# Patient Record
Sex: MALE | Race: WHITE
[De-identification: names, ages, dates, MRNs, and addresses within clinical notes are randomized per-mention and may not be internally consistent; named-entity substitution may affect disease eponyms.]

---

## 2019-08-29 ENCOUNTER — HOSPITAL ENCOUNTER (EMERGENCY)
Dept: HOSPITAL 65 - ER | Age: 49
Discharge: HOME | End: 2019-08-29
Payer: OTHER GOVERNMENT

## 2019-08-29 VITALS — BODY MASS INDEX: 26.51 KG/M2 | WEIGHT: 200 LBS | HEIGHT: 73 IN

## 2019-08-29 VITALS — SYSTOLIC BLOOD PRESSURE: 122 MMHG | DIASTOLIC BLOOD PRESSURE: 88 MMHG

## 2019-08-29 VITALS — SYSTOLIC BLOOD PRESSURE: 122 MMHG | DIASTOLIC BLOOD PRESSURE: 79 MMHG
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
## 2019-08-29 DIAGNOSIS — F17.210: ICD-10-CM

## 2019-08-29 DIAGNOSIS — S33.5XXA: Primary | ICD-10-CM
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
## 2019-08-29 PROCEDURE — 96372 THER/PROPH/DIAG INJ SC/IM: CPT

## 2019-08-29 PROCEDURE — 99284 EMERGENCY DEPT VISIT MOD MDM: CPT

## 2019-08-29 RX ADMIN — DIAZEPAM STA MG: 5 TABLET ORAL at 15:42

## 2019-08-29 RX ADMIN — DIAZEPAM STA MG: 5 INJECTION, SOLUTION INTRAMUSCULAR; INTRAVENOUS at 15:42

## 2019-08-29 RX ADMIN — KETOROLAC TROMETHAMINE ONE MG: 60 INJECTION, SOLUTION INTRAMUSCULAR at 15:39

## 2019-08-29 RX ADMIN — MEPERIDINE HYDROCHLORIDE STA MG: 50 INJECTION INTRAMUSCULAR; INTRAVENOUS; SUBCUTANEOUS at 15:38

## 2019-08-29 NOTE — NUR
ARRIVAL

PATIENT ARRIVED TO [facility] AMBULATORY, C/O OF BACK PAIN SINCE MONDAY, PATIENT 
STATES HE WAS PULLING HIS SEMI TRUCK INTO AN AREA WHEN HE HIT THE PIER CAUSES 
HIM TO JAR HIS BACK, ATTEMPTED TO TREAT HIMSELF AT HOME, CAME TO THE ED FOR 
EVAL.

## 2019-08-29 NOTE — ER.PDOC
General


Chief Complaint:  Lower Back Pain or Injury


Stated Complaint:  BACK PAIN


Time seen by MD:  16:00


Source:  patient


Exam Limitations:  no limitations





History of Present Illness


Timing/Duration:  week


Severity/Quality:  moderate


Radiation:  upper legs


Method of Injury:  twisted


Modifying Factors:  improves with immobilization, improves with pain medication,

improves with rest


Associated Symptoms:  muscle spasms


Allergies:  


Coded Allergies:  


     No Known Allergies (Unverified , 8/29/19)


Home Meds


No Active Prescriptions or Reported Meds





Past Medical History


Medical History:  no pertinent history


Surgical History:  no surgical history





Social History


Smoking:  cigarettes


Alcohol Use:  heavy


Drug Use:  none





Reviewed


Nursing Reviewed:  Vital Signs, Abn. Noted





Review of Systems


All Other Systems:  Reviewed and Negative





Physical Exam


General Appearance:  No Apparent Distress, WD/WN


HEENT:  PERRL/EOMI, Normal ENT Inspection, TMs Normal, Pharynx Normal


Neck:  Non-Tender, Normal Alignment


Cardiovascular/Respiratory:  Regular Rate, Rhythm, No M/R/G, Normal Peripheral 

Pulses, No JVD, Normal Breath Sounds, No Respiratory Distress


Gastrointestinal:  Normal Bowel Sounds, No Organomegaly, No Pulsatile Mass, Non 

Tender, Soft


Back:  Other (r si joint tenderness)


Extremities:  No Evidence of Injury, Normal Range of Motion, Non-Tender, No 

Pedal Edema, Pelvis Stable, Other (SLR 60 DEG R  SIDE)


Neuro/Psych:  Alert,  nml/symmetrical, mood/effect nml, No Motor/Sensory 

Deficits, Relexes nml


Skin:  Normal Color, Warm/Dry





Results/Orders


Results/Orders





Vital Signs








  Date Time  Temp Pulse Resp B/P (MAP) Pulse Ox O2 Delivery O2 Flow Rate FiO2


 


8/29/19 15:22 98.1 102 20 122/79 (93) 95 Room Air  


 


8/29/19 15:19 98.1 102 20  95 Room Air  


 


8/29/19 15:17 98.1 102 20     











Departure


Time of Disposition:  16:00


Disposition:  01 HOME, SELF-CARE


Impression:  


   Primary Impression:  


   Lumbar sprain


Condition:  Improved


Referrals:  


DANY FERGUSON DO, MD (PCP)


PRIMARY CARE PROVIDER


Scripts


No Active Prescriptions or Reported Meds


Duration or Time Spent with Pa:  ESTEFANI FUENTES MD              Aug 29, 2019 15:28

## 2019-12-04 ENCOUNTER — HOSPITAL ENCOUNTER (EMERGENCY)
Dept: HOSPITAL 65 - ER | Age: 49
Discharge: LEFT BEFORE DISCHARGE | End: 2019-12-04
Payer: OTHER GOVERNMENT

## 2019-12-04 VITALS — HEIGHT: 73 IN | WEIGHT: 200 LBS | BODY MASS INDEX: 26.51 KG/M2

## 2019-12-04 VITALS — DIASTOLIC BLOOD PRESSURE: 105 MMHG | SYSTOLIC BLOOD PRESSURE: 151 MMHG

## 2019-12-04 DIAGNOSIS — F17.210: ICD-10-CM

## 2019-12-04 DIAGNOSIS — R26.89: ICD-10-CM

## 2019-12-04 DIAGNOSIS — M54.5: Primary | ICD-10-CM

## 2019-12-04 DIAGNOSIS — Z90.89: ICD-10-CM

## 2019-12-04 PROCEDURE — 99281 EMR DPT VST MAYX REQ PHY/QHP: CPT

## 2019-12-04 NOTE — ER.PDOC
General


Chief Complaint:  Back Pain/Injury


Stated Complaint:  BACK PAIN


Time seen by MD:  12:20


Source:  patient





History of Present Illness


Initial Comments


He reports he picked up a feed sack yesterday (approx 50lbs), then this morning 

he woke with marked LBP.  He has a significant history of back/neck surgeries. 

He took 1200mg of IBP when he woke up at 0530 this morning without relief. He 

went to see his chiropractor this am and recieved E-stim, US, and an adjustment 

he did get minimal relief from that adjustment. He reports movement worsens the 

pain, denies any leg pain, but does have pain about the bilateral mid-buttocks 

(SI joints).


Radiation:  buttocks


Method of Injury:  lifting


Allergies:  


Coded Allergies:  


     No Known Allergies (Unverified , 8/29/19)


Home Meds


No Active Prescriptions or Reported Meds





Past Medical History


Medical History:  no pertinent history (Multiple surgeries including a Lumbar 

Garcia about L5/S1 and He was told he has disc issues L2/4. )


Surgical History:  tonsillectomy, other (Lumbar Garcia)





Social History


Smoking:  cigarettes


Alcohol Use:  rarely


Drug Use:  none





Review of Systems


Musculoskeletal:  see HPI


All Other Systems:  Reviewed and Negative





Physical Exam


General Appearance:  No Apparent Distress


HEENT:  PERRL/EOMI


Cardiovascular/Respiratory:  Regular Rate, Rhythm, Normal Breath Sounds


Gastrointestinal:  Normal Bowel Sounds


Back:  Normal Inspection, Vertebral Tenderness (L4-Sacrum)


Extremities:  Normal Range of Motion, Pain With Movement


Neuro/Psych:  Oriented x 3, Abnormal Gait (Guarded movements with ambulation)


Skin:  Normal Color, Warm/Dry





Results/Orders


Results/Orders





Vital Signs








  Date Time  Temp Pulse Resp B/P (MAP) Pulse Ox O2 Delivery O2 Flow Rate FiO2


 


12/4/19 12:05 98.0 107 18 151/105 (120) 96 Room Air  


 


12/4/19 12:05 98.0 107 18     


 


12/4/19 11:51 98.0 107 18  96   











Course


Sepsis Screening Results: Posi:  POSITIVE SEPSIS RISK


Duration or Total Time Spent w:  17 M


Vitals & review Data





Vital Sign - Last 24 Hours








 12/4/19 12/4/19 12/4/19





 11:51 12:05 12:05


 


Temp 98.0 98.0 98.0


 


Pulse 107 107 107


 


Resp 18 18 18


 


B/P (MAP)   151/105 (120)


 


Pulse Ox 96  96


 


O2 Delivery   Room Air








Sepsis Infection Criteria Pres:  None


O2 Sat by Pulse Oximetry:  96





Departure


Time of Disposition:  12:55


Disposition:  07 AGAINST MEDICAL ADVICE


Impression:  


   Primary Impression:  


   Low back pain


Condition:  Against Medical Advice


Referrals:  


DANY FERGUSON DO, MD (PCP)


PRIMARY CARE PROVIDER





Additional Instructions:  


He left AMA stating he had an emergent situation at home.  Advised to follow up 

with PCP or return to complete evaluation for continued or worsening symptoms


Scripts


No Active Prescriptions or Reported Meds


Duration or Time Spent with Pa:  15











MEL PERAZA NP            Dec 4, 2019 13:00

## 2019-12-04 NOTE — NUR
AMA

PT LEFT AT THIS TIME AMA. PT STATES THAT HE HAS A EMERGENCY THAT HAS COME UP AT 
HOME. PT EDUCATED ABOUT RISK OF LEAVING WITHOUT COMPLETING VISIT. PT CONTINUED 
TO SAY [facility] MUST LEAVE AT THIS TIME.